# Patient Record
Sex: FEMALE
[De-identification: names, ages, dates, MRNs, and addresses within clinical notes are randomized per-mention and may not be internally consistent; named-entity substitution may affect disease eponyms.]

---

## 2017-07-29 ENCOUNTER — TRANSCRIPTION ENCOUNTER (OUTPATIENT)
Age: 37
End: 2017-07-29

## 2020-08-05 ENCOUNTER — APPOINTMENT (OUTPATIENT)
Dept: PHYSICAL MEDICINE AND REHAB | Facility: CLINIC | Age: 40
End: 2020-08-05
Payer: COMMERCIAL

## 2020-08-05 VITALS — WEIGHT: 104 LBS | OXYGEN SATURATION: 97 % | HEIGHT: 65.5 IN | RESPIRATION RATE: 16 BRPM | BODY MASS INDEX: 17.12 KG/M2

## 2020-08-05 DIAGNOSIS — Z78.9 OTHER SPECIFIED HEALTH STATUS: ICD-10-CM

## 2020-08-05 DIAGNOSIS — Z80.9 FAMILY HISTORY OF MALIGNANT NEOPLASM, UNSPECIFIED: ICD-10-CM

## 2020-08-05 DIAGNOSIS — Z82.62 FAMILY HISTORY OF OSTEOPOROSIS: ICD-10-CM

## 2020-08-05 PROBLEM — Z00.00 ENCOUNTER FOR PREVENTIVE HEALTH EXAMINATION: Status: ACTIVE | Noted: 2020-08-05

## 2020-08-05 PROCEDURE — 99203 OFFICE O/P NEW LOW 30 MIN: CPT

## 2020-08-10 PROBLEM — Z80.9 FAMILY HISTORY OF MALIGNANT NEOPLASM: Status: ACTIVE | Noted: 2020-08-05

## 2020-08-10 PROBLEM — Z78.9 NO PERTINENT PAST MEDICAL HISTORY: Status: RESOLVED | Noted: 2020-08-05 | Resolved: 2020-08-10

## 2020-08-10 PROBLEM — Z82.62 FAMILY HISTORY OF OSTEOPOROSIS: Status: ACTIVE | Noted: 2020-08-05

## 2020-08-10 RX ORDER — CHROMIUM 200 MCG
TABLET ORAL
Refills: 0 | Status: ACTIVE | COMMUNITY

## 2020-08-10 RX ORDER — ESCITALOPRAM OXALATE 20 MG/1
TABLET ORAL
Refills: 0 | Status: ACTIVE | COMMUNITY

## 2020-08-10 RX ORDER — SPIRONOLACTONE 50 MG/1
TABLET ORAL
Refills: 0 | Status: ACTIVE | COMMUNITY

## 2020-09-30 ENCOUNTER — APPOINTMENT (OUTPATIENT)
Dept: PHYSICAL MEDICINE AND REHAB | Facility: CLINIC | Age: 40
End: 2020-09-30
Payer: COMMERCIAL

## 2020-09-30 VITALS — OXYGEN SATURATION: 97 % | BODY MASS INDEX: 17.12 KG/M2 | WEIGHT: 104 LBS | RESPIRATION RATE: 16 BRPM | HEIGHT: 65.5 IN

## 2020-09-30 PROCEDURE — 99214 OFFICE O/P EST MOD 30 MIN: CPT

## 2020-09-30 NOTE — HISTORY OF PRESENT ILLNESS
[FreeTextEntry1] : Ms. KAREEM GILES is a very pleasant 40 year female who comes in for follow up evaluation of lower back pain after participating in PT since her previous visit. The patient reports that despite compliance with PT/HEP there has not been any significant improvement in symptoms. At present the symptoms are relatively unchanged. The pain is located primarily on her lower back radiating to the left leg hip/thigh, intermittent in nature and described as achy. The pain is rated as 3/10 during today's visit, and ranges from 3-8/10. The patient's symptoms are aggravated by standing for too long  and alleviated by certain positions. The patient denies any night pain, numbness/tingling, weakness, or bowel/bladder dysfunction. The patient has no other complaints at this time.

## 2020-09-30 NOTE — ASSESSMENT
[FreeTextEntry1] : Impression:\par 1. Lumbar Facet Syndrome\par 2. Left GTPS\par 3. Possible Left L5 Radiculopathy\par \par Plan: The patient has not shown the anticipated response to conservative management with physical therapy and oral medication, reporting approximately 50-60% change in symptoms. Further review of the history, physical examination, and imaging, the patient's symptoms still seem consistent with Lumbar Facet Syndrome and Left GTPS with possible Left L5 Radiculopathy. The diagnosis was reviewed with the patient. We discussed all the potential treatment options including physical therapy, oral medication, interventional spine procedures, and surgery; as well as alternative therapeutics such as acupuncture and massage. At this time I am recommending that the patient undergo an MRI of the LSpine to further evaluate for disc pathology and nerve root involvement. The patient will followup in 1 week. The patient expressed verbal understanding and is in agreement with the plan of care. All of the patient's questions and concerns were addressed during today's visit.

## 2020-09-30 NOTE — PHYSICAL EXAM
[FreeTextEntry1] : GEN: AAOx3, NAD.\par PSYCH: Normal mood and affect. Responds appropriately to commands.\par EYES: Sclerae Anicteric. No discharge. EOMI.\par RESP: Breathing unlabored.\par CV: DP pulses 2+ and equal. No varicosities noted.\par EXT: No C/C/E.\par SKIN: No lesions noted.\par STRENGTH: 5/5 bilateral hip flexors, knee extensors, knee flexors, ankle dorsiflexors, long toe extensors, ankle plantar flexors, hip extensors, hip abductors.\par TONE: Normal, No clonus.\par REFLEXES: 2+ symmetric patella, medial hamstring, achilles. Plantars downgoing bilaterally.\par SENS: Grossly intact to light touch bilateral lower extremities.\par INSP: Spine alignment is midline, with no evidence of scoliosis.\par STANCE: No Trendelenburg with single leg stance.\par GAIT: Non antalgic, normal reciprocating heel to toe\par LUMBAR ROM: Flexion full/pain free. Extension, L-side-bending/oblique extension full (+) axial Sx. \par HIP ROM: Full and pain free bilaterally.\par PALP: (+) TTP L-GTB. There is no tenderness over the midline spinous processes, paravertebral muscles, SIJ bilaterally.\par SPECIAL: SLR and Slump test negative bilaterally. FADIR negative bilaterally. TAY (+) Left.

## 2020-10-16 ENCOUNTER — APPOINTMENT (OUTPATIENT)
Dept: PHYSICAL MEDICINE AND REHAB | Facility: CLINIC | Age: 40
End: 2020-10-16
Payer: COMMERCIAL

## 2020-10-16 DIAGNOSIS — M76.02 GLUTEAL TENDINITIS, LEFT HIP: ICD-10-CM

## 2020-10-16 PROCEDURE — 99443: CPT

## 2020-10-22 ENCOUNTER — APPOINTMENT (OUTPATIENT)
Dept: PHYSICAL MEDICINE AND REHAB | Facility: CLINIC | Age: 40
End: 2020-10-22
Payer: COMMERCIAL

## 2020-10-22 DIAGNOSIS — M54.5 LOW BACK PAIN: ICD-10-CM

## 2020-10-22 PROBLEM — M76.02 GLUTEAL TENDINITIS, LEFT HIP: Status: ACTIVE | Noted: 2020-08-10

## 2020-10-22 PROCEDURE — 64494 INJ PARAVERT F JNT L/S 2 LEV: CPT | Mod: LT

## 2020-10-22 PROCEDURE — 99072 ADDL SUPL MATRL&STAF TM PHE: CPT

## 2020-10-22 PROCEDURE — 64493 INJ PARAVERT F JNT L/S 1 LEV: CPT | Mod: LT

## 2021-03-19 ENCOUNTER — APPOINTMENT (OUTPATIENT)
Dept: PHYSICAL MEDICINE AND REHAB | Facility: CLINIC | Age: 41
End: 2021-03-19
Payer: COMMERCIAL

## 2021-03-19 VITALS — BODY MASS INDEX: 17.12 KG/M2 | HEIGHT: 65.5 IN | RESPIRATION RATE: 16 BRPM | WEIGHT: 104 LBS | OXYGEN SATURATION: 98 %

## 2021-03-19 PROCEDURE — 99214 OFFICE O/P EST MOD 30 MIN: CPT

## 2021-03-19 PROCEDURE — 99072 ADDL SUPL MATRL&STAF TM PHE: CPT

## 2021-03-19 NOTE — PHYSICAL EXAM
[FreeTextEntry1] : GEN: AAOx3, NAD.\par STRENGTH: 5/5 bilateral hip flexors, knee extensors, knee flexors, ankle dorsiflexors, long toe extensors, ankle plantar flexors, hip extensors, hip abductors.\par TONE: Normal, No clonus.\par REFLEXES: 2+ symmetric patella, medial hamstring, achilles. Plantars downgoing bilaterally.\par SENS: Grossly intact to light touch bilateral lower extremities.\par INSP: Spine alignment is midline, with no evidence of scoliosis.\par STANCE: No Trendelenburg with single leg stance.\par GAIT: Non antalgic, normal reciprocating heel to toe\par LUMBAR ROM: Flexion full/pain free. Extension, L-side-bending/oblique extension full (+) axial Sx. \par HIP ROM: Full and pain free bilaterally.\par PALP: (+) TTP B-SIJ. There is no tenderness over the midline spinous processes, paravertebral muscles bilaterally.\par SPECIAL: SLR and Slump test negative bilaterally. FADIR negative bilaterally. TAY (+) Left>Right.

## 2021-03-19 NOTE — ASSESSMENT
[FreeTextEntry1] : Impression:\par 1. Bilateral SIJ Pain\par \par Plan: The history, physical examination, and imaging were reviewed along with diagnostic value of interventional procedures. Symptoms seem more consistent with SIJ pain as the Facet injections only resulted in ~25% improvement. The imaging and diagnosis were reviewed with the patient. We discussed all the potential treatment options including physical therapy, oral medication, interventional spine procedures, and surgery; as well as alternative therapeutics such as acupuncture and massage. At this time the patient is interested in interventional options for both diagnostic and therapeutic value as conservative treatment thus far has failed. We will obtain approval for Bilateral SIJ CSI. The patient expressed verbal understanding and is in agreement with the plan of care. All of the patient's questions and concerns were addressed during today's visit. \par

## 2021-03-19 NOTE — HISTORY OF PRESENT ILLNESS
[FreeTextEntry1] : Ms. KAREEM GILES is a very pleasant 40 year female who comes in for follow up evaluation of lower back pain after undergoing an facet CSI on 10/22/2020. The patient reports approximately 25% improvement following the procedure. At present the symptoms are relatively unchanged. The pain is located primarily on her lower back radiating to the left leg hip/thigh, intermittent in nature and described as achy. The pain is rated as 3/10 during today's visit, and ranges from 3-8/10. The patient's symptoms are aggravated by standing or sitting for too long as well as exercise and alleviated by nothing. The patient denies any night pain, numbness/tingling, weakness, or bowel/bladder dysfunction. The patient has no other complaints at this time.

## 2021-03-19 NOTE — DATA REVIEWED
[FreeTextEntry1] : MR LS 10/2020: L5-S1 Facet joint arthropathy. L4-5 central disc protrusion without central canal stenosis or foraminal stenosis.

## 2021-04-13 PROBLEM — M46.1 SACROILIITIS: Status: ACTIVE | Noted: 2021-03-19

## 2021-04-15 ENCOUNTER — APPOINTMENT (OUTPATIENT)
Dept: PHYSICAL MEDICINE AND REHAB | Facility: CLINIC | Age: 41
End: 2021-04-15
Payer: COMMERCIAL

## 2021-04-15 DIAGNOSIS — M46.1 SACROILIITIS, NOT ELSEWHERE CLASSIFIED: ICD-10-CM

## 2021-04-15 PROCEDURE — 99072 ADDL SUPL MATRL&STAF TM PHE: CPT

## 2021-04-15 PROCEDURE — 27096 INJECT SACROILIAC JOINT: CPT | Mod: 50

## 2021-07-21 ENCOUNTER — APPOINTMENT (OUTPATIENT)
Dept: PHYSICAL MEDICINE AND REHAB | Facility: CLINIC | Age: 41
End: 2021-07-21
Payer: COMMERCIAL

## 2021-07-21 VITALS — RESPIRATION RATE: 16 BRPM | HEIGHT: 65.5 IN | WEIGHT: 104 LBS | BODY MASS INDEX: 17.12 KG/M2

## 2021-07-21 PROCEDURE — 99072 ADDL SUPL MATRL&STAF TM PHE: CPT

## 2021-07-21 PROCEDURE — 99213 OFFICE O/P EST LOW 20 MIN: CPT

## 2021-07-29 NOTE — HISTORY OF PRESENT ILLNESS
[FreeTextEntry1] : Ms. KAREEM GILES is a very pleasant 40 year female who comes in for follow up evaluation of lower back pain. She has undergone Facet and SIJ CSI, neither of which have provided any relief. Symptoms remain relatively unchanged. The pain is located primarily on her lower back radiating to the left leg hip/thigh, intermittent in nature and described as achy. The pain is rated as 3/10 during today's visit, and ranges from 3-8/10. The patient's symptoms are aggravated by standing or sitting for too long as well as exercise and alleviated by nothing. The patient denies any night pain, numbness/tingling, weakness, or bowel/bladder dysfunction. The patient has no other complaints at this time.

## 2021-07-29 NOTE — ASSESSMENT
[FreeTextEntry1] : Impression:\par 1. Left Lower Extremity Pain\par \par Plan: The history, physical examination, and imaging were reviewed along with diagnostic value of interventional procedures. The patient has not responded to Facet/SIJ CSI, PT/Acupuncture, and there is no MR evidence of neurogenic compromise to suggest a radicular component. Symptoms at this point are unclear in etiology. I have given the patient a referral for a second opinion to see if any further light can be shed on her condition. The patient may return here if there is a need for further interventional treatment. We may consider EMG/NCV if there is no other clear diagnosis. The patient expressed verbal understanding and is in agreement with the plan of care. All of the patient's questions and concerns were addressed during today's visit. \par

## 2022-04-26 ENCOUNTER — APPOINTMENT (OUTPATIENT)
Dept: PHYSICAL MEDICINE AND REHAB | Facility: CLINIC | Age: 42
End: 2022-04-26
Payer: COMMERCIAL

## 2022-04-26 VITALS — HEIGHT: 65.5 IN | WEIGHT: 104 LBS | BODY MASS INDEX: 17.12 KG/M2

## 2022-04-26 DIAGNOSIS — M54.16 RADICULOPATHY, LUMBAR REGION: ICD-10-CM

## 2022-04-26 PROCEDURE — 99214 OFFICE O/P EST MOD 30 MIN: CPT

## 2022-04-26 RX ORDER — OXYCODONE AND ACETAMINOPHEN 5; 325 MG/1; MG/1
5-325 TABLET ORAL
Qty: 14 | Refills: 0 | Status: ACTIVE | COMMUNITY
Start: 2022-04-26 | End: 1900-01-01

## 2022-04-26 RX ORDER — METHOCARBAMOL 750 MG/1
750 TABLET, FILM COATED ORAL
Qty: 14 | Refills: 1 | Status: ACTIVE | COMMUNITY
Start: 2022-04-26 | End: 1900-01-01

## 2022-04-26 RX ORDER — METHYLPREDNISOLONE 4 MG/1
4 TABLET ORAL
Qty: 2 | Refills: 0 | Status: ACTIVE | COMMUNITY
Start: 2022-04-26 | End: 1900-01-01

## 2022-04-26 NOTE — PHYSICAL EXAM
[FreeTextEntry1] : LUMBAR\par GEN: AAOx3. NAD.\par INSP: Spine alignment midline. No evidence of scoliosis.\par STANCE: Trendelenburg/SLS (-) R (-) L. \par GAIT: (+) Antalgic. Normal reciprocating heel to toe\par SENS: Grossly intact to LT BLE.\par REFLEXES: Symmetric patella, achilles. \par TONE: Normal. No clonus.\par SPECIAL: SLR/Slump (-) R (-) L.   [place] (-) R (-) L.\par                 FADIR (-) R (-) L.          TAY (-) R (-) L.  \par PALP: Midline/Spinous processes (-).   Paraspinals (-) R  (-) L.   \par            QL (-) R (-) L.      SIJ (-) R (-) L.            GTB (-) R (-) L.\par \par LSpine          ROM    Axial Sx    LE Sx \par Flex               Full       (-)           R (-) L (-).\par Ext                 Full       (-)           R (-) L (-).\par Lat Flex         Full       (-)           R (-) L (-).       \par Rotation         Full       (-)           R (-) L (-). \par Oblique Ext    Full       (-)           R (-) L (-).  \par \par HIP ROM:    RIGHT   Sx        LEFT   Sx\par Flex             Full       (-)         Full     (-)\par IR                 Full       (-)        Full     (-)\par ER               Full       (-)        Full     (-)\par \par STRENGTH:    RIGHT      LEFT\par Hip Flex            4/5 5/5\par Hip ABd            5/5 5/5\par Knee Ext           5/5 5/5\par Ankle DF           5/5 5/5\par Ankle PF           5/5 5/5\par EHL                   5/5 5/5\par EV                    5/5 5/5

## 2022-04-26 NOTE — HISTORY OF PRESENT ILLNESS
[FreeTextEntry1] : Ms. KAREEM GILES is a very pleasant 40 year female who comes in for evaluation of new onset LBP radiating to both legs that has been ongoing for 1-2 weeks without any specific precipitating event. The pain is located in her lower back radiating to bilateral legs thigh/shin, constant in nature and described as sharp/shooting. The pain is rated as 8/10 and ranges from 6-10/10. The patient's symptoms are aggravated by sitting, bending and alleviated by standing and oxycodone. She has taken NSAIDs and Tylenol without relief. The patient denies any night pain, numbness/tingling, weakness, or bowel/bladder dysfunction. The patient has no other complaints at this time.

## 2022-04-26 NOTE — ASSESSMENT
[FreeTextEntry1] : Impression:\par 1. Lumbar Disc Herniation with Radiculopathy\par \par Plan: The history, physical examination, and prior imaging were reviewed. The patient's new symptoms seem to be consistent with radiculopathy. The patient has been taking oral medication and modifying activity without relief and is functionally debilitated. She is currently taking Oxycodone at bedtime that is left over from dental procedures just to be able to sleep for a few hours. I have provided the patient with a referral for an MRI LSpine along with Rx for Medrol Taper and Robaxin along with a refill of her Oxycodone. We will likely plan for JIM pending her MRI results. The patient expressed verbal understanding and is in agreement with the plan of care. All of the patient's questions and concerns were addressed during today's visit. \par

## 2022-05-02 ENCOUNTER — APPOINTMENT (OUTPATIENT)
Dept: PHYSICAL MEDICINE AND REHAB | Facility: CLINIC | Age: 42
End: 2022-05-02

## 2024-03-01 ENCOUNTER — NON-APPOINTMENT (OUTPATIENT)
Age: 44
End: 2024-03-01

## 2024-03-21 ENCOUNTER — NON-APPOINTMENT (OUTPATIENT)
Age: 44
End: 2024-03-21

## 2024-04-18 ENCOUNTER — APPOINTMENT (OUTPATIENT)
Dept: BREAST CENTER | Facility: CLINIC | Age: 44
End: 2024-04-18